# Patient Record
Sex: FEMALE | URBAN - METROPOLITAN AREA
[De-identification: names, ages, dates, MRNs, and addresses within clinical notes are randomized per-mention and may not be internally consistent; named-entity substitution may affect disease eponyms.]

---

## 2019-02-20 ENCOUNTER — APPOINTMENT (RX ONLY)
Dept: URBAN - METROPOLITAN AREA CLINIC 100 | Facility: CLINIC | Age: 69
Setting detail: DERMATOLOGY
End: 2019-02-20

## 2019-02-20 DIAGNOSIS — Z41.9 ENCOUNTER FOR PROCEDURE FOR PURPOSES OTHER THAN REMEDYING HEALTH STATE, UNSPECIFIED: ICD-10-CM

## 2019-02-20 PROCEDURE — ? DYSPORT

## 2019-02-20 PROCEDURE — ? FILLERS

## 2019-02-20 PROCEDURE — ? ADDITIONAL NOTES

## 2019-02-20 NOTE — PROCEDURE: DYSPORT
[place] Units: 0
Additional Area 5 Location: upper lip cutaneous
Additional Area 3 Location: glabella
Expiration Date (Month Year): 08/31/2019
Consent: Written consent obtained. Risks include but not limited to lid/brow ptosis, bruising, swelling, diplopia, temporary effect, incomplete chemical denervation.
Glabellar Complex Units: [address] [facility]
Price (Use Numbers Only, No Special Characters Or $): 0.00
Detail Level: Simple
Additional Area 4 Location: lateral brows
Lot #: [id number]
Additional Area 2 Location: [facility]
Additional Area 2 Units: 60
Additional Area 1 Location: cutaneous lip ( N/C)
Dilution (U/ 0.1cc): 1.5
Additional Area 6 Location: high forehead 2 cm above brows

## 2019-02-20 NOTE — PROCEDURE: FILLERS
Additional Area 2 Location: oral commissures.  Restylane diluted with 1% Lidocaine, injected vermillion lips fine lines
Decollete Filler  Volume In Cc: 0
Additional Area 3 Location: Glabbellar fine lines, Nasalabial folds, Marionette lines, vermillion lip
Include Cannula Information In Note?: No
Additional Area 1 Location: lateral mouth, fine lines perioral, marionette lines, lateral cheeks, vermillion lips
Additional Area 1 Location: lateral cheeks and jawline lateral mouth
Include Cannula Brand?: DermaSculpt
Additional Area 2 Location: vermillion fine lines, nasalabial folds,oral commissure
Additional Anesthesia Volume In Cc: 6
Additional Area 1 Location: lateral mouth, lateral cheeks, marionette lines, perioral fine lines
Map Statment: See [address] for Complete Details
Include Cannula Length?: 1.5 inch
Expiration Date (Month Year): 07/31/2021
Expiration Date (Month Year): 08/31/2019
Anesthesia Volume In Cc: 0.6
Lot #: [id number]
Lot #: [id number]
Include Cannula Size?: 25G
Anesthesia Type: 1% lidocaine without epinephrine
Expiration Date (Month Year): 06/30/2021
Post-Care Instructions: Patient instructed to apply ice to reduce swelling.
Additional Area 5 Location: Chvermillion lips, lateral mouth
Consent: Written consent obtained. Risks include but not limited to bruising, beading, irregular texture, ulceration, infection, allergic reaction, scar formation, incomplete augmentation, temporary nature, procedural pain.
Price (Use Numbers Only, No Special Characters Or $): 0.00
Additional Area 4 Location: lateral Jawline, Vermillion lip fine lines, tear trough, Nasalabial folds and Chin
Lot #: [id number]
Additional Area 3 Location: Lateral Jawline, Lateral cheeks, oral commissures
Detail Level: Zone
Additional Area 2 Volume In Cc: 1
Filler: Restylane-L
Additional Area 4 Location: Lateral Cheeks
Additional Area 2 Location: Nasalabial, Glabellar fine lines- Complimentary

## 2019-02-21 ENCOUNTER — APPOINTMENT (RX ONLY)
Dept: URBAN - METROPOLITAN AREA CLINIC 100 | Facility: CLINIC | Age: 69
Setting detail: DERMATOLOGY
End: 2019-02-21

## 2019-02-21 DIAGNOSIS — Z41.9 ENCOUNTER FOR PROCEDURE FOR PURPOSES OTHER THAN REMEDYING HEALTH STATE, UNSPECIFIED: ICD-10-CM

## 2019-02-21 PROCEDURE — ? IN-HOUSE DISPENSING PHARMACY

## 2019-02-21 NOTE — PROCEDURE: IN-HOUSE DISPENSING PHARMACY
Product 4 Unit Type: mg
Product 11 Price/Unit (In Dollars): 0
Product 1 Amount/Unit (Numbers Only): [address]
Send Charges To Patient Encounter: Yes
Product 3 Price/Unit (In Dollars): 1
Product 4 Application Directions: Apply am and pm
Product 3 Amount/Unit (Numbers Only): 5
Detail Level: Zone
Name Of Product 2: Tri-anitha cream
Name Of Product 3: Latisse 5ml
Product 2 Amount/Unit (Numbers Only): [id number] [address]
Name Of Product 4: Brightening pads 4%
Name Of Product 1: Tretinoin 0.05%
Product 3 Unit Type: ml
Product 1 Unit Type: grams
Product 1 Application Directions: Apply pea size amount to entire face at bedtime only
Product 3 Application Directions: Apply to eyelashes daily

## 2019-03-20 ENCOUNTER — APPOINTMENT (RX ONLY)
Dept: URBAN - METROPOLITAN AREA CLINIC 100 | Facility: CLINIC | Age: 69
Setting detail: DERMATOLOGY
End: 2019-03-20

## 2019-03-20 DIAGNOSIS — Z41.9 ENCOUNTER FOR PROCEDURE FOR PURPOSES OTHER THAN REMEDYING HEALTH STATE, UNSPECIFIED: ICD-10-CM

## 2019-03-20 PROCEDURE — ? DYSPORT

## 2019-03-20 PROCEDURE — ? FILLERS

## 2019-03-20 NOTE — PROCEDURE: FILLERS
Temple Hollows Filler  Volume In Cc: 0
Include Cannula Information In Note?: No
Detail Level: Zone
Expiration Date (Month Year): 08/31/2019
Include Cannula Brand?: DermaSculpt
Expiration Date (Month Year): 10/29/2019
Include Cannula Size?: 25G
Price (Use Numbers Only, No Special Characters Or $): 0.00
Include Cannula Length?: 1.5 inch
Consent: Written consent obtained. Risks include but not limited to bruising, beading, irregular texture, ulceration, infection, allergic reaction, scar formation, incomplete augmentation, temporary nature, procedural pain.
Additional Area 1 Location: marionette lines, vermillion lips, lateral mouth lines, perioral fine lines
Post-Care Instructions: Patient instructed to apply ice to reduce swelling.
Additional Anesthesia Volume In Cc: 6
Additional Area 2 Location: vermillion fine lines, nasalabial folds,oral commissure
Additional Area 1 Location: lateral mouth, fine lines perioral, marionette lines, lateral cheeks, vermillion lips
Additional Area 1 Location: lateral cheeks, lateral jawline, lateral mouth, marionette lines
Filler Comments: ([employer]YSICI[employer] SAMPLE)
Additional Area 2 Location: Nasalabial, Glabellar fine lines- Complimentary
Additional Area 3 Location: Glabbellar fine lines, Nasalabial folds, Marionette lines, vermillion lip
Additional Area 2 Location: oral commissures.  Restylane diluted with 1% Lidocaine, injected vermillion lips fine lines,
Map Statment: See [address] for Complete Details
Additional Area 4 Location: Lateral Cheeks
Additional Area 3 Location: lateral cheeks, lateral Jawline, vermillion lips, lips, lateral mouth, glabellar fine lines
Filler: Juvederm Ultra XC
Additional Area 4 Location: vermillion lips and tear thoughts
Lot #: [id number]
Additional Area 5 Location: medial  Cheeks using cannula
Vermilion Lips Filler Volume In Cc: 0.2
Anesthesia Type: 1% lidocaine without epinephrine
Expiration Date (Month Year): 06/13/2020
Lot #: [id number]
Lot #: [id number]

## 2019-03-20 NOTE — PROCEDURE: DYSPORT
Lateral Platysmal Bands Units: 0
Additional Area 6 Location: neckbands
Additional Area 3 Location: lateral brows
Additional Area 4 Location: upper lip
Expiration Date (Month Year): 09/30/2019
Price (Use Numbers Only, No Special Characters Or $): 0.00
Lot #: [id number]
Dilution (U/ 0.1cc): 1.5
Detail Level: Simple
Consent: Written consent obtained. Risks include but not limited to lid/brow ptosis, bruising, swelling, diplopia, temporary effect, incomplete chemical denervation.
Additional Area 1 Location: lateral eyes
Additional Area 2 Units: 48
Additional Area 2 Location: Forehead 2 cm above the brows
Additional Area 5 Location: chin

## 2019-12-18 ENCOUNTER — APPOINTMENT (RX ONLY)
Dept: URBAN - METROPOLITAN AREA CLINIC 100 | Facility: CLINIC | Age: 69
Setting detail: DERMATOLOGY
End: 2019-12-18

## 2019-12-18 DIAGNOSIS — Z41.9 ENCOUNTER FOR PROCEDURE FOR PURPOSES OTHER THAN REMEDYING HEALTH STATE, UNSPECIFIED: ICD-10-CM

## 2019-12-18 PROCEDURE — ? FILLERS

## 2019-12-18 NOTE — PROCEDURE: FILLERS
Nasolabial Folds Filler Volume In Cc: 0
Map Statment: See [address] for Complete Details
Additional Area 4 Location: lateral jawline, lateral mouth, glabella lines,
Additional Area 4 Location: Perioral
Additional Area 5 Location: Cheeks, vermillion lips, marionette fine lines, glabellar fine lines, lateral mouth
Include Cannula Information In Note?: No
Anesthesia Type: 1% lidocaine without epinephrine
Lot #: [id number]
Lot #: [id number]
Lot #: [id number]
Expiration Date (Month Year): 2022-4-30
Expiration Date (Month Year): 09/2021
Expiration Date (Month Year): 08/31/2019
Additional Anesthesia Volume In Cc: 6
Include Cannula Brand?: DermaSculpt
Include Cannula Size?: 25G
Consent: Written consent obtained. Risks include but not limited to bruising, beading, irregular texture, ulceration, infection, allergic reaction, scar formation, incomplete augmentation, temporary nature, procedural pain.
Filler: Restylane-L
Include Cannula Length?: 1.5 inch
Additional Area 1 Location: jawline, lateral mouth, oral comesure
Post-Care Instructions: Patient instructed to apply ice to reduce swelling.
Additional Area 1 Location: lateral mouth, perioral fine lines, vermillion lips, marionette lines
Additional Area 1 Location: lateral mouth, fine lines perioral, marionette lines, lateral cheeks, vermillion lips
Detail Level: Zone
Additional Area 1 Volume In Cc: 1
Additional Area 2 Location: cheeks, jawline, oral commissure
Additional Area 2 Location: Lips, oral commissure, glabellar fine fines
Additional Area 2 Location: Nasalabial, Glabellar fine lines- Complimentary
Price (Use Numbers Only, No Special Characters Or $): 0.00
Additional Area 3 Location: Glabbellar fine lines, Nasalabial folds, Marionette lines, vermillion lip

## 2019-12-19 ENCOUNTER — RX ONLY (OUTPATIENT)
Age: 69
Setting detail: RX ONLY
End: 2019-12-19

## 2019-12-19 RX ORDER — TRETIONIN 1 MG/G
CREAM TOPICAL
Qty: 1 | Refills: 2 | Status: ERX | COMMUNITY
Start: 2019-12-19

## 2021-04-14 ENCOUNTER — APPOINTMENT (RX ONLY)
Dept: URBAN - METROPOLITAN AREA CLINIC 100 | Facility: CLINIC | Age: 71
Setting detail: DERMATOLOGY
End: 2021-04-14

## 2021-04-14 VITALS — TEMPERATURE: 97.6 F

## 2021-04-14 DIAGNOSIS — Z41.9 ENCOUNTER FOR PROCEDURE FOR PURPOSES OTHER THAN REMEDYING HEALTH STATE, UNSPECIFIED: ICD-10-CM

## 2021-04-14 PROCEDURE — ? FILLERS

## 2021-04-14 PROCEDURE — ? DYSPORT

## 2021-04-14 NOTE — PROCEDURE: FILLERS
Additional Area 4 Volume In Cc: 0
Additional Area 5 Location: Cheeks, vermillion lips, marionette fine lines, glabellar fine lines, lateral mouth
Anesthesia Type: 1% lidocaine without epinephrine
Consent: Written consent obtained. Risks include but not limited to bruising, beading, irregular texture, ulceration, infection, allergic reaction, scar formation, incomplete augmentation, temporary nature, procedural pain.
Post-Care Instructions: Patient instructed to apply ice to reduce swelling.
Additional Area 4 Location: Perioral
Lot #: [id number]
Expiration Date (Month Year): 2023-08
Lot #: [id number]
Include Cannula Information In Note?: No
Lot #: [id number]
Additional Anesthesia Volume In Cc: 6
Expiration Date (Month Year): 09/2021
Expiration Date (Month Year): 08/31/2019
Include Cannula Brand?: DermaSculpt
Include Cannula Size?: 25G
Additional Area 1 Location: diluted to inject , lateral mouth,upper lip fine lines
Detail Level: Zone
Additional Area 1 Location: lateral mouth, perioral fine lines, vermillion lips, marionette lines
Include Cannula Length?: 1.5 inch
Additional Area 1 Volume In Cc: 1
Additional Area 2 Location: cheeks, jawline, oral commissure
Additional Area 1 Location: lateral mouth, fine lines perioral, marionette lines, lateral cheeks, vermillion lips
Price (Use Numbers Only, No Special Characters Or $): 0.00
Additional Area 2 Location: Lips, oral commissure, glabellar fine fines
Filler: Restylane-L
Additional Area 2 Location: Nasalabial, Glabellar fine lines- Complimentary
Map Statment: See [address] for Complete Details
Additional Area 3 Location: Glabbellar fine lines, Nasalabial folds, Marionette lines, vermillion lip
Additional Area 4 Location: lateral jawline, lateral mouth, glabella lines,

## 2021-04-14 NOTE — PROCEDURE: DYSPORT
Dilution (U/ 0.1cc): 1.5
Show Additional Area 2: Yes
Forehead Units: 0
Nasal Root Units: 10
Additional Area 1 Location: lat brows
Consent: Written consent obtained. Risks include but not limited to lid/brow ptosis, bruising, swelling, diplopia, temporary effect, incomplete chemical denervation.
Show Ucl Units: No
Additional Area 3 Location: high forehead 3 cm above brows
Glabellar Complex Units: [address]
Additional Area 4 Location: 2cm high forehead
Expiration Date (Month Year): 11/2021
Price (Use Numbers Only, No Special Characters Or $): 0.00
Additional Area 2 Location: crows feet
Detail Level: Simple
Additional Area 5 Location: glabella
Lot #: [id number]

## 2021-04-15 ENCOUNTER — RX ONLY (OUTPATIENT)
Age: 71
Setting detail: RX ONLY
End: 2021-04-15

## 2021-04-15 RX ORDER — VALACYCLOVIR HYDROCHLORIDE 500 MG/1
TABLET, FILM COATED ORAL
Qty: 30 | Refills: 0 | Status: ERX | COMMUNITY
Start: 2021-04-15

## 2021-11-17 ENCOUNTER — APPOINTMENT (RX ONLY)
Dept: URBAN - METROPOLITAN AREA CLINIC 100 | Facility: CLINIC | Age: 71
Setting detail: DERMATOLOGY
End: 2021-11-17

## 2021-11-17 ENCOUNTER — RX ONLY (OUTPATIENT)
Age: 71
Setting detail: RX ONLY
End: 2021-11-17

## 2021-11-17 DIAGNOSIS — Z41.9 ENCOUNTER FOR PROCEDURE FOR PURPOSES OTHER THAN REMEDYING HEALTH STATE, UNSPECIFIED: ICD-10-CM

## 2021-11-17 PROCEDURE — ? DYSPORT

## 2021-11-17 RX ORDER — TRETIONIN 1 MG/G
CREAM TOPICAL
Qty: 20 | Refills: 0 | Status: ERX | COMMUNITY
Start: 2021-11-17

## 2021-11-17 NOTE — PROCEDURE: DYSPORT
Lcl Root Units: 0
Show Additional Area 5: Yes
Show Right And Left Brow Units: No
Expiration Date (Month Year): 2022-08
Additional Area 4 Location: 2cm high forehead
Additional Area 2 Location: lat brows
Price (Use Numbers Only, No Special Characters Or $): 0.00
done
Detail Level: Simple
Additional Area 2 Units: 10
Lot #: [id number]
Additional Area 5 Location: glabella
Dilution (U/ 0.1cc): 1.5
Additional Area 3 Location: high forehead 3 cm above brows
Consent: Written consent obtained. Risks include but not limited to lid/brow ptosis, bruising, swelling, diplopia, temporary effect, incomplete chemical denervation.
Additional Area 1 Location: high forehead
Glabellar Complex Units: 48

## 2022-06-01 ENCOUNTER — APPOINTMENT (RX ONLY)
Dept: URBAN - METROPOLITAN AREA CLINIC 100 | Facility: CLINIC | Age: 72
Setting detail: DERMATOLOGY
End: 2022-06-01

## 2022-06-01 DIAGNOSIS — Z41.9 ENCOUNTER FOR PROCEDURE FOR PURPOSES OTHER THAN REMEDYING HEALTH STATE, UNSPECIFIED: ICD-10-CM

## 2022-06-01 PROCEDURE — ? DYSPORT

## 2022-06-01 PROCEDURE — ? FILLERS

## 2022-06-01 NOTE — PROCEDURE: FILLERS
Decollete Filler  Volume In Cc: 0
Additional Anesthesia Volume In Cc: 6
Include Cannula Information In Note?: No
Additional Area 1 Location: lateral cheeks, lateral jawline, lateral mouth, marionette lines, vermillion lips.
Inventory Information: This plan will send filler information to inventory based on the fillers you select. Multiple fillers can be sent but you must ensure you select the appropriate fillers in the inventory plan.
Additional Area 1 Volume In Cc: 1
Detail Level: Detailed
Filler: Restylane-L
Show Inventory Tab: Show
Map Statment: See [address] for Complete Details
Anesthesia Type: 1% lidocaine with epinephrine
Consent: Written consent obtained. Risks include but not limited to bruising, beading, irregular texture, ulceration, infection, allergic reaction, scar formation, incomplete augmentation, temporary nature, procedural pain.
Post-Care Instructions: Patient instructed to apply ice to reduce swelling.
Anesthesia Volume In Cc: 0.5

## 2022-06-01 NOTE — PROCEDURE: DYSPORT
Show Anterior Platysmal Band Units: Yes
Glabellar Complex Units: 0
Show Right And Left Pupillary Line Units: No
Additional Area 1 Location: glabella
Additional Area 4 Location: 2cm high forehead
Topical Anesthesia?: 20% benzocaine, 8% lidocaine, 4% tetracaine
Expiration Date (Month Year): 2022-08
Price (Use Numbers Only, No Special Characters Or $): 0.00
Additional Area 2 Units: 10
Lot #: [id number]
Detail Level: Simple
Show Inventory Tab: Show
Dilution (U/ 0.1cc): 1.5
Additional Area 2 Location: lateral brows
Additional Area 3 Location: high forehead 3 cm above brows
Length Of Topical Anesthesia Application (Optional): 15 minutes
Consent: Written consent obtained. Risks include but not limited to lid/brow ptosis, bruising, swelling, diplopia, temporary effect, incomplete chemical denervation.
Additional Area 1 Units: [address]

## 2022-06-22 ENCOUNTER — APPOINTMENT (RX ONLY)
Dept: URBAN - METROPOLITAN AREA CLINIC 100 | Facility: CLINIC | Age: 72
Setting detail: DERMATOLOGY
End: 2022-06-22

## 2022-06-22 DIAGNOSIS — Z41.9 ENCOUNTER FOR PROCEDURE FOR PURPOSES OTHER THAN REMEDYING HEALTH STATE, UNSPECIFIED: ICD-10-CM

## 2022-06-22 PROCEDURE — ? ADDITIONAL NOTES

## 2022-06-22 NOTE — PROCEDURE: ADDITIONAL NOTES
Additional Notes: Post treatment- pt looks great, no further treatment
Render Risk Assessment In Note?: no
Detail Level: Detailed

## 2022-10-26 ENCOUNTER — APPOINTMENT (RX ONLY)
Dept: URBAN - METROPOLITAN AREA CLINIC 100 | Facility: CLINIC | Age: 72
Setting detail: DERMATOLOGY
End: 2022-10-26

## 2022-10-26 DIAGNOSIS — Z41.9 ENCOUNTER FOR PROCEDURE FOR PURPOSES OTHER THAN REMEDYING HEALTH STATE, UNSPECIFIED: ICD-10-CM

## 2022-10-26 PROCEDURE — ? FILLERS

## 2022-10-26 PROCEDURE — ? DYSPORT

## 2022-10-26 NOTE — PROCEDURE: DYSPORT
Show Orbicularis Oculi Units: Yes
Left Periorbital Units: 0
Expiration Date (Month Year): 2022-08
Show Right And Left Pupillary Line Units: No
Additional Area 5 Location: glabella
Additional Area 1 Location: lat brows
Price (Use Numbers Only, No Special Characters Or $): 0.00
Additional Area 1 Units: 10
Additional Area 2 Location: crows feet
Consent: Written consent obtained. Risks include but not limited to lid/brow ptosis, bruising, swelling, diplopia, temporary effect, incomplete chemical denervation.
Lot #: [id number]
Detail Level: Simple
Glabellar Complex Units: [address]
Dilution (U/ 0.1cc): 1.5
Additional Area 4 Location: 2cm high forehead
Show Inventory Tab: Show

## 2022-10-26 NOTE — PROCEDURE: FILLERS
Cheeks Filler Volume In Cc: 0
Use Map Statement For Sites (Optional): No
Additional Area 1 Location: lateral mouth, perioral fine lines, marionette lines.
Map Statment: See [address] for Complete Details
Consent: Written consent obtained. Risks include but not limited to bruising, beading, irregular texture, ulceration, infection, allergic reaction, scar formation, incomplete augmentation, temporary nature, procedural pain.
Post-Care Instructions: Patient instructed to apply ice to reduce swelling.
Number Of Syringes (Required For Inventory): 1
Anesthesia Type: 1% lidocaine with epinephrine
Inventory Information: This plan will send filler information to inventory based on the fillers you select. Multiple fillers can be sent but you must ensure you select the appropriate fillers in the inventory tab.
Anesthesia Volume In Cc: 0.5
Detail Level: Detailed
Show Inventory Tab: Show
Additional Anesthesia Volume In Cc: 6
Additional Area 1 Location: lateral cheeks, lateral jawline, lateral mouth, marionette lines.
Filler: Restylane-L

## 2024-02-07 ENCOUNTER — APPOINTMENT (RX ONLY)
Dept: URBAN - METROPOLITAN AREA CLINIC 98 | Facility: CLINIC | Age: 74
Setting detail: DERMATOLOGY
End: 2024-02-07

## 2024-02-07 DIAGNOSIS — Z41.9 ENCOUNTER FOR PROCEDURE FOR PURPOSES OTHER THAN REMEDYING HEALTH STATE, UNSPECIFIED: ICD-10-CM

## 2024-02-07 PROCEDURE — ? DYSPORT

## 2024-02-07 PROCEDURE — ? FILLERS

## 2024-02-07 NOTE — PROCEDURE: DYSPORT
Right Periorbital Skin Units: 0
Show Orbicularis Oculi Units: Yes
Expiration Date (Month Year): 2022-08
Additional Area 4 Location: 2cm high forehead
Additional Area 5 Location: glabella
Show Right And Left Pupillary Line Units: No
Additional Area 1 Location: lat brows
Glabellar Complex Units: 50
Price (Use Numbers Only, No Special Characters Or $): 0.00
Detail Level: Simple
Additional Area 1 Units: 10
Consent: Written consent obtained. Risks include but not limited to lid/brow ptosis, bruising, swelling, diplopia, temporary effect, incomplete chemical denervation.
Additional Area 2 Location: crows feet
Lot #: [id number]
Show Inventory Tab: Show
Dilution (U/0.1 Cc): 1.5

## 2024-02-07 NOTE — PROCEDURE: FILLERS
[place]s Filler Volume In Cc: 0
Filler: Restylane-L
Show Inventory Tab: Show
Number Of Syringes (Required For Inventory): 1
Include Cannula Information In Note?: No
Consent: Written consent obtained. Risks include but not limited to bruising, beading, irregular texture, ulceration, infection, allergic reaction, scar formation, incomplete augmentation, temporary nature, procedural pain.
Additional Area 1 Location: lateral mouth, perioral fine lines, marionette lines.
Post-Care Instructions: Patient instructed to apply ice to reduce swelling.
Map Statment: See Attach Map for Complete Details
Anesthesia Type: 1% lidocaine with epinephrine
Anesthesia Volume In Cc: 0.5
Additional Anesthesia Volume In Cc: 6
Additional Area 1 Location: lat cheeks ,lateral mouth, marionette lines,
Inventory Information: This plan will send filler information to inventory based on the fillers you select. Multiple fillers can be sent but you must ensure you select the appropriate fillers in the inventory tab.
Detail Level: Detailed

## 2024-10-30 ENCOUNTER — APPOINTMENT (RX ONLY)
Dept: URBAN - METROPOLITAN AREA CLINIC 98 | Facility: CLINIC | Age: 74
Setting detail: DERMATOLOGY
End: 2024-10-30

## 2024-10-30 DIAGNOSIS — Z41.9 ENCOUNTER FOR PROCEDURE FOR PURPOSES OTHER THAN REMEDYING HEALTH STATE, UNSPECIFIED: ICD-10-CM

## 2024-10-30 PROCEDURE — ? FILLERS

## 2024-10-30 PROCEDURE — ? DYSPORT

## 2024-10-30 NOTE — PROCEDURE: DYSPORT
Right Periorbital Skin Units: 0
Show Orbicularis Oculi Units: Yes
Expiration Date (Month Year): 2022-08
Additional Area 4 Location: 2cm high forehead
Additional Area 5 Location: glabella
Show Right And Left Pupillary Line Units: No
Additional Area 1 Location: lat brows
Glabellar Complex Units: 50
Price (Use Numbers Only, No Special Characters Or $): 0.00
Detail Level: Simple
Additional Area 1 Units: 10
Consent: Written consent obtained. Risks include but not limited to lid/brow ptosis, bruising, swelling, diplopia, temporary effect, incomplete chemical denervation.
Additional Area 2 Location: crows feet
Lot #: [id number]
Show Inventory Tab: Show
Dilution (U/0.1 Cc): 1.5

## 2024-11-13 ENCOUNTER — APPOINTMENT (RX ONLY)
Dept: URBAN - METROPOLITAN AREA CLINIC 98 | Facility: CLINIC | Age: 74
Setting detail: DERMATOLOGY
End: 2024-11-13

## 2024-11-13 DIAGNOSIS — Z41.9 ENCOUNTER FOR PROCEDURE FOR PURPOSES OTHER THAN REMEDYING HEALTH STATE, UNSPECIFIED: ICD-10-CM

## 2024-11-13 PROCEDURE — ? FILLERS

## 2024-11-13 PROCEDURE — ? DYSPORT

## 2024-11-13 PROCEDURE — ? ADDITIONAL NOTES

## 2024-11-13 ASSESSMENT — LOCATION ZONE DERM: LOCATION ZONE: FACE

## 2024-11-13 ASSESSMENT — LOCATION SIMPLE DESCRIPTION DERM: LOCATION SIMPLE: RIGHT FOREHEAD

## 2024-11-13 ASSESSMENT — LOCATION DETAILED DESCRIPTION DERM: LOCATION DETAILED: RIGHT INFERIOR LATERAL FOREHEAD

## 2024-11-13 NOTE — PROCEDURE: ADDITIONAL NOTES
Detail Level: Detailed
Additional Notes: [id number] Exp. 09/30/2025 Dysport
Render Risk Assessment In Note?: no

## 2024-11-13 NOTE — PROCEDURE: DYSPORT
Show Additional Area 3: Yes
Additional Area 4 Location: 2cm high forehead
[facility] Units: 0
Expiration Date (Month Year): 2022-08
Consent: Written consent obtained. Risks include but not limited to lid/brow ptosis, bruising, swelling, diplopia, temporary effect, incomplete chemical denervation.
Show Ucl Units: No
Additional Area 5 Location: glabella
Additional Area 1 Location: right brow
Price (Use Numbers Only, No Special Characters Or $): 0.00
Detail Level: Simple
Additional Area 1 Units: 4
Additional Area 2 Location: crows feet
Show Inventory Tab: Show
Lot #: [id number]
Dilution (U/0.1 Cc): 1.5

## 2024-11-13 NOTE — PROCEDURE: FILLERS
Number Of Syringes (Required For Inventory): 1
Tem[place]s Filler Volume In Cc: 0
Show Inventory Tab: Show
Filler: Juvederm Volbella XC
Include Cannula Information In Note?: No
Consent: Written consent obtained. Risks include but not limited to bruising, beading, irregular texture, ulceration, infection, allergic reaction, scar formation, incomplete augmentation, temporary nature, procedural pain.
Post-Care Instructions: Patient instructed to apply ice to reduce swelling.
Map Statment: See Attach Map for Complete Details
Anesthesia Type: 1% lidocaine with epinephrine
Additional Area 1 Location: lateral mouth, perioral fine lines, marionette lines.
Anesthesia Volume In Cc: 0.5
Additional Anesthesia Volume In Cc: 6
Inventory Information: This plan will send filler information to inventory based on the fillers you select. Multiple fillers can be sent but you must ensure you select the appropriate fillers in the inventory tab.
Detail Level: Detailed
Additional Area 1 Location: Perioral and oral commesure